# Patient Record
Sex: MALE | Race: WHITE | Employment: FULL TIME | URBAN - METROPOLITAN AREA
[De-identification: names, ages, dates, MRNs, and addresses within clinical notes are randomized per-mention and may not be internally consistent; named-entity substitution may affect disease eponyms.]

---

## 2019-09-06 PROCEDURE — 82784 ASSAY IGA/IGD/IGG/IGM EACH: CPT | Performed by: INTERNAL MEDICINE

## 2019-09-06 PROCEDURE — 86803 HEPATITIS C AB TEST: CPT | Performed by: INTERNAL MEDICINE

## 2019-09-06 PROCEDURE — 87340 HEPATITIS B SURFACE AG IA: CPT | Performed by: INTERNAL MEDICINE

## 2019-10-17 PROCEDURE — 88305 TISSUE EXAM BY PATHOLOGIST: CPT | Performed by: INTERNAL MEDICINE

## 2020-03-10 DIAGNOSIS — E11.9 DIABETES MELLITUS (HCC): Primary | ICD-10-CM

## 2021-01-08 ENCOUNTER — HOSPITAL ENCOUNTER (OUTPATIENT)
Dept: GENERAL RADIOLOGY | Age: 51
Discharge: HOME | End: 2021-01-08
Attending: FAMILY MEDICINE
Payer: COMMERCIAL

## 2021-01-08 DIAGNOSIS — J06.9 VIRAL UPPER RESPIRATORY TRACT INFECTION: ICD-10-CM

## 2021-01-08 DIAGNOSIS — R05.9 COUGH: ICD-10-CM

## 2021-01-08 DIAGNOSIS — U07.1 LAB TEST POSITIVE FOR DETECTION OF COVID-19 VIRUS: ICD-10-CM

## 2021-01-08 PROCEDURE — 71046 X-RAY EXAM CHEST 2 VIEWS: CPT | Performed by: FAMILY MEDICINE

## 2021-01-10 ENCOUNTER — APPOINTMENT (OUTPATIENT)
Dept: GENERAL RADIOLOGY | Facility: HOSPITAL | Age: 51
DRG: 177 | End: 2021-01-10
Attending: EMERGENCY MEDICINE
Payer: COMMERCIAL

## 2021-01-10 ENCOUNTER — APPOINTMENT (OUTPATIENT)
Dept: CT IMAGING | Facility: HOSPITAL | Age: 51
DRG: 177 | End: 2021-01-10
Attending: EMERGENCY MEDICINE
Payer: COMMERCIAL

## 2021-01-10 ENCOUNTER — HOSPITAL ENCOUNTER (INPATIENT)
Facility: HOSPITAL | Age: 51
LOS: 6 days | Discharge: HOME | DRG: 177 | End: 2021-01-16
Attending: EMERGENCY MEDICINE | Admitting: HOSPITALIST
Payer: COMMERCIAL

## 2021-01-10 DIAGNOSIS — U07.1 ACUTE RESPIRATORY FAILURE DUE TO COVID-19 (HCC): Primary | ICD-10-CM

## 2021-01-10 DIAGNOSIS — J96.00 ACUTE RESPIRATORY FAILURE DUE TO COVID-19 (HCC): Primary | ICD-10-CM

## 2021-01-10 LAB
ALBUMIN SERPL-MCNC: 2.8 G/DL (ref 3.4–5)
ALBUMIN/GLOB SERPL: 0.5 {RATIO} (ref 1–2)
ALP LIVER SERPL-CCNC: 95 U/L
ALT SERPL-CCNC: 26 U/L
ANION GAP SERPL CALC-SCNC: 6 MMOL/L (ref 0–18)
ANION GAP SERPL CALC-SCNC: 6 MMOL/L (ref 0–18)
ANTIBODY SCREEN: NEGATIVE
AST SERPL-CCNC: 40 U/L (ref 15–37)
BASOPHILS # BLD AUTO: 0.05 X10(3) UL (ref 0–0.2)
BASOPHILS NFR BLD AUTO: 0.4 %
BILIRUB SERPL-MCNC: 0.5 MG/DL (ref 0.1–2)
BILIRUB UR QL: NEGATIVE
BUN BLD-MCNC: 13 MG/DL (ref 7–18)
BUN BLD-MCNC: 13 MG/DL (ref 7–18)
BUN/CREAT SERPL: 14.1 (ref 10–20)
BUN/CREAT SERPL: 14.1 (ref 10–20)
CALCIUM BLD-MCNC: 9.2 MG/DL (ref 8.5–10.1)
CALCIUM BLD-MCNC: 9.2 MG/DL (ref 8.5–10.1)
CHLORIDE SERPL-SCNC: 96 MMOL/L (ref 98–112)
CHLORIDE SERPL-SCNC: 96 MMOL/L (ref 98–112)
CK SERPL-CCNC: 45 U/L
CLARITY UR: CLEAR
CO2 SERPL-SCNC: 31 MMOL/L (ref 21–32)
CO2 SERPL-SCNC: 31 MMOL/L (ref 21–32)
COLOR UR: YELLOW
CREAT BLD-MCNC: 0.92 MG/DL
CREAT BLD-MCNC: 0.92 MG/DL
CRP SERPL-MCNC: 7.44 MG/DL (ref ?–0.3)
D DIMER PPP FEU-MCNC: 1.12 UG/ML FEU (ref ?–0.52)
DEPRECATED HBV CORE AB SER IA-ACNC: 731.5 NG/ML
DEPRECATED RDW RBC AUTO: 37 FL (ref 35.1–46.3)
EOSINOPHIL # BLD AUTO: 0 X10(3) UL (ref 0–0.7)
EOSINOPHIL NFR BLD AUTO: 0 %
ERYTHROCYTE [DISTWIDTH] IN BLOOD BY AUTOMATED COUNT: 11.5 % (ref 11–15)
GLOBULIN PLAS-MCNC: 5.5 G/DL (ref 2.8–4.4)
GLUCOSE BLD-MCNC: 332 MG/DL (ref 70–99)
GLUCOSE BLD-MCNC: 332 MG/DL (ref 70–99)
GLUCOSE BLDC GLUCOMTR-MCNC: 295 MG/DL (ref 70–99)
GLUCOSE BLDC GLUCOMTR-MCNC: 379 MG/DL (ref 70–99)
GLUCOSE UR-MCNC: >=500 MG/DL
HCT VFR BLD AUTO: 47.7 %
HGB BLD-MCNC: 16.4 G/DL
HGB UR QL STRIP.AUTO: NEGATIVE
IMM GRANULOCYTES # BLD AUTO: 0.17 X10(3) UL (ref 0–1)
IMM GRANULOCYTES NFR BLD: 1.3 %
LDH SERPL L TO P-CCNC: 533 U/L
LEUKOCYTE ESTERASE UR QL STRIP.AUTO: NEGATIVE
LYMPHOCYTES # BLD AUTO: 0.96 X10(3) UL (ref 1–4)
LYMPHOCYTES NFR BLD AUTO: 7.1 %
M PROTEIN MFR SERPL ELPH: 8.3 G/DL (ref 6.4–8.2)
MCH RBC QN AUTO: 30.7 PG (ref 26–34)
MCHC RBC AUTO-ENTMCNC: 34.4 G/DL (ref 31–37)
MCV RBC AUTO: 89.3 FL
MONOCYTES # BLD AUTO: 0.68 X10(3) UL (ref 0.1–1)
MONOCYTES NFR BLD AUTO: 5 %
NEUTROPHILS # BLD AUTO: 11.61 X10 (3) UL (ref 1.5–7.7)
NEUTROPHILS # BLD AUTO: 11.61 X10(3) UL (ref 1.5–7.7)
NEUTROPHILS NFR BLD AUTO: 86.2 %
NITRITE UR QL STRIP.AUTO: NEGATIVE
NT-PROBNP SERPL-MCNC: 296 PG/ML (ref ?–125)
NT-PROBNP SERPL-MCNC: 383 PG/ML (ref ?–125)
OSMOLALITY SERPL CALC.SUM OF ELEC: 289 MOSM/KG (ref 275–295)
OSMOLALITY SERPL CALC.SUM OF ELEC: 289 MOSM/KG (ref 275–295)
PH UR: 6 [PH] (ref 5–8)
PLATELET # BLD AUTO: 453 10(3)UL (ref 150–450)
POTASSIUM SERPL-SCNC: 3.8 MMOL/L (ref 3.5–5.1)
POTASSIUM SERPL-SCNC: 3.8 MMOL/L (ref 3.5–5.1)
PROCALCITONIN SERPL-MCNC: 0.05 NG/ML (ref ?–0.16)
PROCALCITONIN SERPL-MCNC: 0.05 NG/ML (ref ?–0.16)
PROT UR-MCNC: NEGATIVE MG/DL
RBC # BLD AUTO: 5.34 X10(6)UL
RH BLOOD TYPE: POSITIVE
SODIUM SERPL-SCNC: 133 MMOL/L (ref 136–145)
SODIUM SERPL-SCNC: 133 MMOL/L (ref 136–145)
TROPONIN I SERPL-MCNC: <0.045 NG/ML (ref ?–0.04)
UROBILINOGEN UR STRIP-ACNC: 2
WBC # BLD AUTO: 13.5 X10(3) UL (ref 4–11)

## 2021-01-10 PROCEDURE — 71260 CT THORAX DX C+: CPT | Performed by: EMERGENCY MEDICINE

## 2021-01-10 PROCEDURE — 71045 X-RAY EXAM CHEST 1 VIEW: CPT | Performed by: EMERGENCY MEDICINE

## 2021-01-10 PROCEDURE — 99221 1ST HOSP IP/OBS SF/LOW 40: CPT | Performed by: INTERNAL MEDICINE

## 2021-01-10 PROCEDURE — 3E0333Z INTRODUCTION OF ANTI-INFLAMMATORY INTO PERIPHERAL VEIN, PERCUTANEOUS APPROACH: ICD-10-PCS | Performed by: EMERGENCY MEDICINE

## 2021-01-10 PROCEDURE — 99223 1ST HOSP IP/OBS HIGH 75: CPT | Performed by: HOSPITALIST

## 2021-01-10 RX ORDER — DEXAMETHASONE SODIUM PHOSPHATE 4 MG/ML
6 VIAL (ML) INJECTION DAILY
Status: DISCONTINUED | OUTPATIENT
Start: 2021-01-11 | End: 2021-01-16

## 2021-01-10 RX ORDER — SODIUM CHLORIDE 9 MG/ML
INJECTION, SOLUTION INTRAVENOUS CONTINUOUS
Status: DISCONTINUED | OUTPATIENT
Start: 2021-01-10 | End: 2021-01-13

## 2021-01-10 RX ORDER — DEXAMETHASONE SODIUM PHOSPHATE 4 MG/ML
6 VIAL (ML) INJECTION DAILY
Status: DISCONTINUED | OUTPATIENT
Start: 2021-01-10 | End: 2021-01-10

## 2021-01-10 RX ORDER — ATORVASTATIN CALCIUM 40 MG/1
40 TABLET, FILM COATED ORAL NIGHTLY
COMMUNITY

## 2021-01-10 RX ORDER — BRIMONIDINE TARTRATE 2 MG/ML
1 SOLUTION/ DROPS OPHTHALMIC 3 TIMES DAILY
Status: DISCONTINUED | OUTPATIENT
Start: 2021-01-10 | End: 2021-01-16

## 2021-01-10 RX ORDER — MORPHINE SULFATE 4 MG/ML
4 INJECTION, SOLUTION INTRAMUSCULAR; INTRAVENOUS EVERY 2 HOUR PRN
Status: DISCONTINUED | OUTPATIENT
Start: 2021-01-10 | End: 2021-01-16

## 2021-01-10 RX ORDER — ACETAMINOPHEN 325 MG/1
650 TABLET ORAL EVERY 4 HOURS PRN
Status: DISCONTINUED | OUTPATIENT
Start: 2021-01-10 | End: 2021-01-16

## 2021-01-10 RX ORDER — HYDROCODONE BITARTRATE AND ACETAMINOPHEN 5; 325 MG/1; MG/1
2 TABLET ORAL EVERY 4 HOURS PRN
Status: DISCONTINUED | OUTPATIENT
Start: 2021-01-10 | End: 2021-01-16

## 2021-01-10 RX ORDER — POTASSIUM CHLORIDE 20 MEQ/1
40 TABLET, EXTENDED RELEASE ORAL ONCE
Status: COMPLETED | OUTPATIENT
Start: 2021-01-10 | End: 2021-01-10

## 2021-01-10 RX ORDER — SODIUM CHLORIDE 9 MG/ML
INJECTION, SOLUTION INTRAVENOUS CONTINUOUS
Status: DISCONTINUED | OUTPATIENT
Start: 2021-01-10 | End: 2021-01-10

## 2021-01-10 RX ORDER — HYDROCODONE BITARTRATE AND ACETAMINOPHEN 5; 325 MG/1; MG/1
1 TABLET ORAL EVERY 4 HOURS PRN
Status: DISCONTINUED | OUTPATIENT
Start: 2021-01-10 | End: 2021-01-16

## 2021-01-10 RX ORDER — ATORVASTATIN CALCIUM 40 MG/1
40 TABLET, FILM COATED ORAL NIGHTLY
Status: DISCONTINUED | OUTPATIENT
Start: 2021-01-10 | End: 2021-01-16

## 2021-01-10 RX ORDER — MORPHINE SULFATE 2 MG/ML
1 INJECTION, SOLUTION INTRAMUSCULAR; INTRAVENOUS EVERY 2 HOUR PRN
Status: DISCONTINUED | OUTPATIENT
Start: 2021-01-10 | End: 2021-01-16

## 2021-01-10 RX ORDER — ENOXAPARIN SODIUM 100 MG/ML
40 INJECTION SUBCUTANEOUS DAILY
Status: DISCONTINUED | OUTPATIENT
Start: 2021-01-10 | End: 2021-01-13

## 2021-01-10 RX ORDER — MORPHINE SULFATE 2 MG/ML
2 INJECTION, SOLUTION INTRAMUSCULAR; INTRAVENOUS EVERY 2 HOUR PRN
Status: DISCONTINUED | OUTPATIENT
Start: 2021-01-10 | End: 2021-01-16

## 2021-01-10 RX ORDER — TIMOLOL MALEATE 5 MG/ML
1 SOLUTION/ DROPS OPHTHALMIC 2 TIMES DAILY
Status: DISCONTINUED | OUTPATIENT
Start: 2021-01-10 | End: 2021-01-16

## 2021-01-10 RX ORDER — AZITHROMYCIN 250 MG/1
250 TABLET, FILM COATED ORAL DAILY
Status: ON HOLD | COMMUNITY
End: 2021-01-16

## 2021-01-10 RX ORDER — ONDANSETRON 2 MG/ML
4 INJECTION INTRAMUSCULAR; INTRAVENOUS EVERY 6 HOURS PRN
Status: DISCONTINUED | OUTPATIENT
Start: 2021-01-10 | End: 2021-01-16

## 2021-01-10 RX ORDER — PREDNISONE 20 MG/1
20 TABLET ORAL 2 TIMES DAILY
Status: ON HOLD | COMMUNITY
End: 2021-01-16

## 2021-01-10 RX ORDER — DEXAMETHASONE SODIUM PHOSPHATE 4 MG/ML
6 VIAL (ML) INJECTION ONCE
Status: COMPLETED | OUTPATIENT
Start: 2021-01-10 | End: 2021-01-10

## 2021-01-10 RX ORDER — ENOXAPARIN SODIUM 100 MG/ML
40 INJECTION SUBCUTANEOUS DAILY
Status: DISCONTINUED | OUTPATIENT
Start: 2021-01-10 | End: 2021-01-10

## 2021-01-10 NOTE — PROGRESS NOTES
Sx onset- 12/27  Covid positive- 12/31  Admitted- 1/10    Onset of sx >14 days     CXR- 1/10- atypical pneumonia  Chest CT- 1/10- Negative PE, Bilateral GGO     DVT prophylaxis- Lovenox 40mg daily     02- 10LHF     Current temp- 98.2, afebrile this admissi

## 2021-01-10 NOTE — PLAN OF CARE
Received patient from ER. Patient alert and oriented x4, pleasant. Patient oriented to room and call light. Continues on 10L O2 via high flow nasal cannula, saturating in the low 90's. Dry cough. No complaint of pain. Afebrile.  Bed alarm and fall precautio relaxation techniques  - Monitor for opioid side effects  - Notify MD/LIP if interventions unsuccessful or patient reports new pain  - Anticipate increased pain with activity and pre-medicate as appropriate  Outcome: Progressing     Problem: RISK FOR INFEC cognitive ability or social support system  Outcome: Progressing     Problem: Altered Communication/Language Barrier  Goal: Patient/Family is able to understand and participate in their care  Description: Interventions:  - Assess communication ability and

## 2021-01-10 NOTE — PLAN OF CARE
Patient received in bed from emergency room. Alert x 4. Patient made comfortable in the bed. Vital signs taken and stable. Intravenous fluids infusing and tolerated. Patient denied pain or discomfort. Currently on 10 liters of high flow nasal cannula.  No c

## 2021-01-10 NOTE — PROGRESS NOTES
[facility] Note: Antimicrobial Weight Based Dose Adjustment for: ceftriaxone (ROCEPHIN)    Deacon Padilla is a 44year old patient who has been prescribed ceftriaxone (ROCEPHIN) 1 g every 24 hours.       Wt Readings from Last 6 Encounters:  01/10/21 : 106.6 k

## 2021-01-10 NOTE — H&P
[address] Patient Status:  Emergency    1970 MRN [id number]   Location [address] Attending Liz Mark MD   Hosp Day # 0 PCP Gregg Grimaldo MD     Date:  1/10/2021 Warm and hydrated  PSYCHIATRIC: Calm and cooperative   HEENT:  Head was atraumatic and normocephalic. Eyes:  Extraocular muscles were intact. Sclera was anicteric. Pupils were equally reactive to light. Ears: There were no lesions.   Nose:  No lesions K 3.8  3.8   CL 96*  96*   CO2 31.0  31.0   ALKPHO 95   AST 40*   ALT 26   BILT 0.5   TP 8.3*       Xr Chest Ap Portable  (cpt=71045)    Result Date: 1/10/2021  CONCLUSION:  1. Atypical pneumonia.     Dictated by (CST): Kay Hill MD on 1/10/

## 2021-01-10 NOTE — CONSULTS
[facility] HOSP - [facility]    Report of Consultation    Chas Sterling Patient Status:  Emergency    1970 MRN [id number]   Location [address] Attending Elyse Mcmillan MD   Hosp Day # 0 PCP Will MD Ankur     Date of Admis Psychiatric: denies depression, anxiety  Hematologic: denies bruising        Physical Exam:   Blood pressure 155/90, pulse 67, temperature 98.9 °F (37.2 °C), temperature source Oral, resp.  rate 24, height 5' 11\" (1.803 m), weight 235 lb (106.6 kg), SpO2 Hypertension    Plan   -Patient presents with evidence of acute hypoxemic respiratory failure secondary COVID-19 pneumonia. Symptom onset on 12/27/2020.  -CT chest with no evidence of pulmonary embolism. Bilateral groundglass opacities seen.   -Given symp

## 2021-01-10 NOTE — ED NOTES
Orders for admission, patient is aware of plan and ready to go upstairs. Any questions, please call Maryjane Albert RN at [address].    Type of COVID test sent:+ COVID  COVID Suspicion level:High    Titratable drug(s) infusin.9ns bolus  Rate:    LOC at time

## 2021-01-10 NOTE — ED PROVIDER NOTES
Patient Seen in: [facility] AND [facility] Emergency Department      History   Patient presents with:  Difficulty Breathing    Stated Complaint: T/L-call from wife. pt diagnosed with covid 12/31. worsening SOB x3 days.      HPI/Subjective:   HPI    44year old ma present. Heart sounds: Normal heart sounds. No murmur. Pulmonary:      Effort: Tachypnea and respiratory distress present. Breath sounds: Rhonchi present. No decreased breath sounds or wheezing.    Abdominal:      General: There is no distension components within normal limits   POCT GLUCOSE - Abnormal; Notable for the following components:    POC Glucose  295 (*)     All other components within normal limits   CBC W/ DIFFERENTIAL - Abnormal; Notable for the following components:    WBC 13.5 (*) normal for rate and rhythm     Radiology findings: Xr Chest Ap Portable  (cpt=71045)    Result Date: 1/10/2021  CONCLUSION:  1. Atypical pneumonia.     Dictated by (CST): Jaylon Patton MD on 1/10/2021 at 1:02 PM     Finalized by (CST): Abdi

## 2021-01-11 LAB
ALBUMIN SERPL-MCNC: 2.2 G/DL (ref 3.4–5)
ALBUMIN/GLOB SERPL: 0.5 {RATIO} (ref 1–2)
ALP LIVER SERPL-CCNC: 82 U/L
ALT SERPL-CCNC: 23 U/L
ANION GAP SERPL CALC-SCNC: 6 MMOL/L (ref 0–18)
AST SERPL-CCNC: 26 U/L (ref 15–37)
BASOPHILS # BLD AUTO: 0.02 X10(3) UL (ref 0–0.2)
BASOPHILS NFR BLD AUTO: 0.2 %
BILIRUB SERPL-MCNC: 0.4 MG/DL (ref 0.1–2)
BUN BLD-MCNC: 14 MG/DL (ref 7–18)
BUN/CREAT SERPL: 20.9 (ref 10–20)
CALCIUM BLD-MCNC: 8.7 MG/DL (ref 8.5–10.1)
CHLORIDE SERPL-SCNC: 102 MMOL/L (ref 98–112)
CO2 SERPL-SCNC: 27 MMOL/L (ref 21–32)
CREAT BLD-MCNC: 0.67 MG/DL
CRP SERPL-MCNC: 5.17 MG/DL (ref ?–0.3)
D DIMER PPP FEU-MCNC: 1.33 UG/ML FEU (ref ?–0.52)
DEPRECATED HBV CORE AB SER IA-ACNC: 495.6 NG/ML
DEPRECATED RDW RBC AUTO: 37.2 FL (ref 35.1–46.3)
EOSINOPHIL # BLD AUTO: 0 X10(3) UL (ref 0–0.7)
EOSINOPHIL NFR BLD AUTO: 0 %
ERYTHROCYTE [DISTWIDTH] IN BLOOD BY AUTOMATED COUNT: 11.2 % (ref 11–15)
EST. AVERAGE GLUCOSE BLD GHB EST-MCNC: 229 MG/DL (ref 68–126)
GLOBULIN PLAS-MCNC: 4.8 G/DL (ref 2.8–4.4)
GLUCOSE BLD-MCNC: 267 MG/DL (ref 70–99)
GLUCOSE BLDC GLUCOMTR-MCNC: 237 MG/DL (ref 70–99)
GLUCOSE BLDC GLUCOMTR-MCNC: 266 MG/DL (ref 70–99)
GLUCOSE BLDC GLUCOMTR-MCNC: 273 MG/DL (ref 70–99)
GLUCOSE BLDC GLUCOMTR-MCNC: 278 MG/DL (ref 70–99)
HBA1C MFR BLD HPLC: 9.6 % (ref ?–5.7)
HCT VFR BLD AUTO: 43.7 %
HGB BLD-MCNC: 14.6 G/DL
IMM GRANULOCYTES # BLD AUTO: 0.09 X10(3) UL (ref 0–1)
IMM GRANULOCYTES NFR BLD: 0.9 %
LDH SERPL L TO P-CCNC: 380 U/L
LYMPHOCYTES # BLD AUTO: 1.17 X10(3) UL (ref 1–4)
LYMPHOCYTES NFR BLD AUTO: 11.3 %
M PROTEIN MFR SERPL ELPH: 7 G/DL (ref 6.4–8.2)
MCH RBC QN AUTO: 30.2 PG (ref 26–34)
MCHC RBC AUTO-ENTMCNC: 33.4 G/DL (ref 31–37)
MCV RBC AUTO: 90.5 FL
MONOCYTES # BLD AUTO: 0.98 X10(3) UL (ref 0.1–1)
MONOCYTES NFR BLD AUTO: 9.5 %
NEUTROPHILS # BLD AUTO: 8.1 X10 (3) UL (ref 1.5–7.7)
NEUTROPHILS # BLD AUTO: 8.1 X10(3) UL (ref 1.5–7.7)
NEUTROPHILS NFR BLD AUTO: 78.1 %
OSMOLALITY SERPL CALC.SUM OF ELEC: 290 MOSM/KG (ref 275–295)
PLATELET # BLD AUTO: 410 10(3)UL (ref 150–450)
POTASSIUM SERPL-SCNC: 4 MMOL/L (ref 3.5–5.1)
RBC # BLD AUTO: 4.83 X10(6)UL
SODIUM SERPL-SCNC: 135 MMOL/L (ref 136–145)
TROPONIN I SERPL-MCNC: <0.045 NG/ML (ref ?–0.04)
WBC # BLD AUTO: 10.4 X10(3) UL (ref 4–11)

## 2021-01-11 PROCEDURE — XW033H5 INTRODUCTION OF TOCILIZUMAB INTO PERIPHERAL VEIN, PERCUTANEOUS APPROACH, NEW TECHNOLOGY GROUP 5: ICD-10-PCS | Performed by: INTERNAL MEDICINE

## 2021-01-11 PROCEDURE — 99233 SBSQ HOSP IP/OBS HIGH 50: CPT | Performed by: HOSPITALIST

## 2021-01-11 PROCEDURE — 99232 SBSQ HOSP IP/OBS MODERATE 35: CPT | Performed by: PHYSICIAN ASSISTANT

## 2021-01-11 NOTE — PLAN OF CARE
Pt night time blood sugar was 379. Dr. Sandi Waller was noted of this with new orders noted. Sliding scale changed to high dose and ACHS. PT slept well all night.  Remains on 10L NC with o2 sats 89-96% He desats with exertion activity but rcovers quickly Verbalizes/displays adequate comfort level or patient's stated pain goal  Description: INTERVENTIONS:  - Encourage pt to monitor pain and request assistance  - Assess pain using appropriate pain scale  - Administer analgesics based on type and severity of other facility with appropriate resources  Description: INTERVENTIONS:  - Identify barriers to discharge w/pt and caregiver  - Include patient/family/discharge partner in discharge planning  - Arrange for needed discharge resources and transportation as ap

## 2021-01-11 NOTE — CM/SW NOTE
ELIA received MDO for CDI- obtain COVID lab result for record. Due to isolation precautions and to conserve PPE, ELIA attempted to contact patient via room phone x2 but to no avail. SW to continue to follow.     Addendum 1/12/2021:  ELIA contacted patient v

## 2021-01-11 NOTE — PAYOR COMM NOTE
Appropriate for inpatient status per guidelines for SOB due to COVID pneumonia with hypoxia.       --------------  ADMISSION REVIEW     Payor: [address] [place] [address] #:  [id number]  Authorization Number: [id number]       ED Provide Musculoskeletal: Full passive range of motion without pain, normal range of motion and neck supple. Normal range of motion. No neck rigidity. Cardiovascular:      Rate and Rhythm: Regular rhythm. Tachycardia present.       Heart sounds: Normal heart Abnormal; Notable for the following components:    C-Reactive Protein 7.44 (*)     All other components within normal limits   FERRITIN - Abnormal; Notable for the following components:    Ferritin 731.5 (*)     All other components within normal limits - will consult      Disposition and Plan     Clinical Impression:  Acute respiratory failure due to COVID-19 [facility])  (primary encounter diagnosis)    Disposition:  Admit  1/10/2021  1:37 pm             H&P - H&P Note             History provided by: Patient entry was good. No crackles or wheezes   ABDOMEN: Soft and non-tender. Bowel sounds were present. MUSCULOSKELETAL:  There was no deformity. There was full range of motion in all the extremities.    EXTREMITIES: There was no edema, clubbing or cyanosis --------------------------       Assessment/Plan:     Acute respiratory failure due to COVID-19 [facility])  - CT of chest negative for PE  - CXR shows bilateral ground-glass opacities  - will continue IV decadron   - no actemra or remdesivir for now  - ID and P 12/31  -CTA no PE / bilateral groundglass opacities  -Mild inflammation - downtrending  -Wbc improved  -Decadron started 1/10  -IV Rocephin started 1/10  -Completed outpatient course of azithromycin prior to admission  -Not candidate for RDV or CCP given s

## 2021-01-11 NOTE — CONSULTS
[facility] ID CONSULT NOTE    Faviloa Whaley Patient Status:  Inpatient    1970 MRN [id number]   Location [facility] 5SW/SE Attending Elli La MD   Hosp Day # 1 PCP Rene Holley MD       Reason for Consultation •  ondansetron HCl (ZOFRAN) injection 4 mg, 4 mg, Intravenous, Q6H PRN  •  cefTRIAXone Sodium (ROCEPHIN) 2 g in sodium chloride 0.9% 100 mL MBP/ADD-vantage, 2 g, Intravenous, Daily  •  dexamethasone Sodium Phosphate (DECADRON) 4 MG/ML injection 6 mg, 6 mg, Lab 01/11/21  0541   RBC 4.83   HGB 14.6   HCT 43.7   MCV 90.5   MCH 30.2   MCHC 33.4   RDW 11.2   NEPRELIM 8.10*   WBC 10.4   .0     Recent Labs   Lab 01/10/21  1101 01/11/21  0541   *  332* 267*   BUN 13  13 14   CREATSERUM 0.92  0.92 0.67*

## 2021-01-11 NOTE — PROGRESS NOTES
[facility]    Progress Note    Kirk Martinez Patient Status:  Inpatient    1970 MRN [id number]   Location [facility] 5SW/SE Attending Arelis Baker MD   Hosp Day # 1 PCP Mallorie Peraza MD        Subjective:    The patient is a standard dosing    D/w unit APN.     Results:     Lab Results   Component Value Date    WBC 10.4 01/11/2021    HGB 14.6 01/11/2021    HCT 43.7 01/11/2021    .0 01/11/2021    CREATSERUM 0.67 (L) 01/11/2021    BUN 14 01/11/2021     (L) 01/11/2021

## 2021-01-11 NOTE — PLAN OF CARE
Covid +: 2020  Symptom Onset: 2020  Tmax: Afebrile  O2: 10L HF at 90%,wean as tolerated    Monitor Labs,    LDH: 380  Ferritin: 495  CRP: 5.17  DDimer: 1.33    Antibiotics: Rocephin   Blood Thinner: lovenox 40mg daily  Decadron:sarte

## 2021-01-12 LAB
ANION GAP SERPL CALC-SCNC: 4 MMOL/L (ref 0–18)
BASOPHILS # BLD AUTO: 0.02 X10(3) UL (ref 0–0.2)
BASOPHILS NFR BLD AUTO: 0.2 %
BUN BLD-MCNC: 12 MG/DL (ref 7–18)
BUN/CREAT SERPL: 20 (ref 10–20)
CALCIUM BLD-MCNC: 8.5 MG/DL (ref 8.5–10.1)
CHLORIDE SERPL-SCNC: 100 MMOL/L (ref 98–112)
CO2 SERPL-SCNC: 30 MMOL/L (ref 21–32)
CREAT BLD-MCNC: 0.6 MG/DL
CRP SERPL-MCNC: 2.42 MG/DL (ref ?–0.3)
D DIMER PPP FEU-MCNC: 3.72 UG/ML FEU (ref ?–0.52)
DEPRECATED HBV CORE AB SER IA-ACNC: 354.3 NG/ML
DEPRECATED RDW RBC AUTO: 35.7 FL (ref 35.1–46.3)
EOSINOPHIL # BLD AUTO: 0.03 X10(3) UL (ref 0–0.7)
EOSINOPHIL NFR BLD AUTO: 0.3 %
ERYTHROCYTE [DISTWIDTH] IN BLOOD BY AUTOMATED COUNT: 11.1 % (ref 11–15)
GLUCOSE BLD-MCNC: 244 MG/DL (ref 70–99)
GLUCOSE BLDC GLUCOMTR-MCNC: 224 MG/DL (ref 70–99)
GLUCOSE BLDC GLUCOMTR-MCNC: 282 MG/DL (ref 70–99)
GLUCOSE BLDC GLUCOMTR-MCNC: 328 MG/DL (ref 70–99)
GLUCOSE BLDC GLUCOMTR-MCNC: 333 MG/DL (ref 70–99)
HCT VFR BLD AUTO: 42.5 %
HGB BLD-MCNC: 14.6 G/DL
IMM GRANULOCYTES # BLD AUTO: 0.16 X10(3) UL (ref 0–1)
IMM GRANULOCYTES NFR BLD: 1.4 %
LDH SERPL L TO P-CCNC: 353 U/L
LYMPHOCYTES # BLD AUTO: 1.38 X10(3) UL (ref 1–4)
LYMPHOCYTES NFR BLD AUTO: 12.5 %
MCH RBC QN AUTO: 30.5 PG (ref 26–34)
MCHC RBC AUTO-ENTMCNC: 34.4 G/DL (ref 31–37)
MCV RBC AUTO: 88.9 FL
MONOCYTES # BLD AUTO: 0.84 X10(3) UL (ref 0.1–1)
MONOCYTES NFR BLD AUTO: 7.6 %
NEUTROPHILS # BLD AUTO: 8.64 X10 (3) UL (ref 1.5–7.7)
NEUTROPHILS # BLD AUTO: 8.64 X10(3) UL (ref 1.5–7.7)
NEUTROPHILS NFR BLD AUTO: 78 %
OSMOLALITY SERPL CALC.SUM OF ELEC: 286 MOSM/KG (ref 275–295)
PLATELET # BLD AUTO: 408 10(3)UL (ref 150–450)
POTASSIUM SERPL-SCNC: 3.8 MMOL/L (ref 3.5–5.1)
RBC # BLD AUTO: 4.78 X10(6)UL
SODIUM SERPL-SCNC: 134 MMOL/L (ref 136–145)
WBC # BLD AUTO: 11.1 X10(3) UL (ref 4–11)

## 2021-01-12 PROCEDURE — 99233 SBSQ HOSP IP/OBS HIGH 50: CPT | Performed by: HOSPITALIST

## 2021-01-12 PROCEDURE — 99232 SBSQ HOSP IP/OBS MODERATE 35: CPT | Performed by: INTERNAL MEDICINE

## 2021-01-12 RX ORDER — ECHINACEA PURPUREA EXTRACT 125 MG
1 TABLET ORAL
Status: DISCONTINUED | OUTPATIENT
Start: 2021-01-12 | End: 2021-01-16

## 2021-01-12 RX ORDER — POTASSIUM CHLORIDE 20 MEQ/1
40 TABLET, EXTENDED RELEASE ORAL ONCE
Status: COMPLETED | OUTPATIENT
Start: 2021-01-12 | End: 2021-01-12

## 2021-01-12 NOTE — PROGRESS NOTES
[facility]     Progress Note        Emma Hayes Patient Status:  Inpatient    1970 MRN [id number]   Location [facility] 5SW/SE Attending Sam Frazier MD   Hosp Day # 2 PCP Bernard Barbour MD       Subjective:   Patient seen a Eyes, TID    •  0.9% NaCl infusion, , Intravenous, Continuous    •  acetaminophen (TYLENOL) tab 650 mg, 650 mg, Oral, Q4H PRN    Or    •  HYDROcodone-acetaminophen (NORCO) 5-325 MG per tab 1 tablet, 1 tablet, Oral, Q4H PRN    Or    •  HYDROcodone-acetamino [address]

## 2021-01-12 NOTE — DIETARY NOTE
Brief NUTRITION NOTE    Pt screened at nutritional risk on admission by RN. Chart review completed by RD due to COVID19+. Spoke with pt over the phone.  He reports increasing po intake, markedly improved appetite, reports ate % of bfast and lunch to

## 2021-01-12 NOTE — PROGRESS NOTES
[facility]    Progress Note    Tod Gaming Patient Status:  Inpatient    1970 MRN [id number]   Location [facility] 5SW/SE Attending Koko Castillo MD   Hosp Day # 2 PCP Cheyanne Carmen MD       Subjective:   Tod Gaming is curre (CST): Erica Patton MD on 1/10/2021 at 1:33 PM              • enoxaparin  40 mg Subcutaneous Daily   • cefTRIAXone  2 g Intravenous Daily   • dexamethasone Sodium Phosphate  6 mg Intravenous Daily   • atorvastatin  40 mg Oral Nightly   • brimoni

## 2021-01-12 NOTE — PLAN OF CARE
Problem: Patient Centered Care  Goal: Patient preferences are identified and integrated in the patient's plan of care  Description: Interventions:  - What would you like us to know as we care for you?  I was tested positive for covid on 12/31  - Provide t physical limitations  - Instruct pt to call for assistance with activity based on assessment  - Modify environment to reduce risk of injury  - Provide assistive devices as appropriate  - Consider OT/PT consult to assist with strengthening/mobility  - Encou INTERVENTIONS:  - Assess for changes in respiratory status  - Assess for changes in mentation and behavior  - Position to facilitate oxygenation and minimize respiratory effort  - Oxygen supplementation based on oxygen saturation or ABGs  - Provide Smoking

## 2021-01-12 NOTE — PLAN OF CARE
Problem: Patient Centered Care  Goal: Patient preferences are identified and integrated in the patient's plan of care  Description: Interventions:  - What would you like us to know as we care for you?  From home with family  - Provide timely, complete, an Progressing     Problem: SAFETY ADULT - FALL  Goal: Free from fall injury  Description: INTERVENTIONS:  - Assess pt frequently for physical needs  - Identify cognitive and physical deficits and behaviors that affect risk of falls.   - [place] fall precaut

## 2021-01-12 NOTE — PROGRESS NOTES
[facility]    Progress Note    Inell Riceville Patient Status:  Inpatient    1970 MRN [id number]   Location 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  ABNO

## 2021-01-12 NOTE — PLAN OF CARE
Covid +: 2020  Symptom Onset: 2020  Tmax: Afebrile  O2: 10L HF at 90%,wean as tolerated    Monitor Labs,    LDH: 380 -> 353  Ferritin: 495 -> 354  CRP: 5.17 -> 2.42  DDimer: 1.33 -> 3.72    Antibiotics: Rocephin   Blood Thinner: love

## 2021-01-13 ENCOUNTER — APPOINTMENT (OUTPATIENT)
Dept: GENERAL RADIOLOGY | Facility: HOSPITAL | Age: 51
DRG: 177 | End: 2021-01-13
Attending: NURSE PRACTITIONER
Payer: COMMERCIAL

## 2021-01-13 ENCOUNTER — APPOINTMENT (OUTPATIENT)
Dept: ULTRASOUND IMAGING | Facility: HOSPITAL | Age: 51
DRG: 177 | End: 2021-01-13
Attending: NURSE PRACTITIONER
Payer: COMMERCIAL

## 2021-01-13 LAB
ANION GAP SERPL CALC-SCNC: 6 MMOL/L (ref 0–18)
BASOPHILS # BLD AUTO: 0.02 X10(3) UL (ref 0–0.2)
BASOPHILS NFR BLD AUTO: 0.2 %
BUN BLD-MCNC: 13 MG/DL (ref 7–18)
BUN/CREAT SERPL: 21 (ref 10–20)
CALCIUM BLD-MCNC: 8.4 MG/DL (ref 8.5–10.1)
CHLORIDE SERPL-SCNC: 100 MMOL/L (ref 98–112)
CO2 SERPL-SCNC: 28 MMOL/L (ref 21–32)
CREAT BLD-MCNC: 0.62 MG/DL
CRP SERPL-MCNC: 1.05 MG/DL (ref ?–0.3)
D DIMER PPP FEU-MCNC: 3.87 UG/ML FEU (ref ?–0.52)
DEPRECATED HBV CORE AB SER IA-ACNC: 379.1 NG/ML
DEPRECATED RDW RBC AUTO: 35.7 FL (ref 35.1–46.3)
EOSINOPHIL # BLD AUTO: 0.15 X10(3) UL (ref 0–0.7)
EOSINOPHIL NFR BLD AUTO: 1.3 %
ERYTHROCYTE [DISTWIDTH] IN BLOOD BY AUTOMATED COUNT: 11.3 % (ref 11–15)
GLUCOSE BLD-MCNC: 274 MG/DL (ref 70–99)
GLUCOSE BLDC GLUCOMTR-MCNC: 240 MG/DL (ref 70–99)
GLUCOSE BLDC GLUCOMTR-MCNC: 285 MG/DL (ref 70–99)
GLUCOSE BLDC GLUCOMTR-MCNC: 336 MG/DL (ref 70–99)
GLUCOSE BLDC GLUCOMTR-MCNC: 342 MG/DL (ref 70–99)
GLUCOSE BLDC GLUCOMTR-MCNC: 417 MG/DL (ref 70–99)
HCT VFR BLD AUTO: 43.4 %
HGB BLD-MCNC: 15.1 G/DL
IMM GRANULOCYTES # BLD AUTO: 0.16 X10(3) UL (ref 0–1)
IMM GRANULOCYTES NFR BLD: 1.4 %
INTERLEUKIN 6 BY MAFD: 8.6 PG/ML
LDH SERPL L TO P-CCNC: 348 U/L
LYMPHOCYTES # BLD AUTO: 1.24 X10(3) UL (ref 1–4)
LYMPHOCYTES NFR BLD AUTO: 10.7 %
MCH RBC QN AUTO: 30.9 PG (ref 26–34)
MCHC RBC AUTO-ENTMCNC: 34.8 G/DL (ref 31–37)
MCV RBC AUTO: 88.8 FL
MONOCYTES # BLD AUTO: 0.88 X10(3) UL (ref 0.1–1)
MONOCYTES NFR BLD AUTO: 7.6 %
NEUTROPHILS # BLD AUTO: 9.15 X10 (3) UL (ref 1.5–7.7)
NEUTROPHILS # BLD AUTO: 9.15 X10(3) UL (ref 1.5–7.7)
NEUTROPHILS NFR BLD AUTO: 78.8 %
OSMOLALITY SERPL CALC.SUM OF ELEC: 288 MOSM/KG (ref 275–295)
PLATELET # BLD AUTO: 391 10(3)UL (ref 150–450)
POTASSIUM SERPL-SCNC: 3.8 MMOL/L (ref 3.5–5.1)
RBC # BLD AUTO: 4.89 X10(6)UL
SODIUM SERPL-SCNC: 134 MMOL/L (ref 136–145)
WBC # BLD AUTO: 11.6 X10(3) UL (ref 4–11)

## 2021-01-13 PROCEDURE — 99233 SBSQ HOSP IP/OBS HIGH 50: CPT | Performed by: HOSPITALIST

## 2021-01-13 PROCEDURE — 99232 SBSQ HOSP IP/OBS MODERATE 35: CPT | Performed by: PHYSICIAN ASSISTANT

## 2021-01-13 PROCEDURE — 71045 X-RAY EXAM CHEST 1 VIEW: CPT | Performed by: NURSE PRACTITIONER

## 2021-01-13 PROCEDURE — 93970 EXTREMITY STUDY: CPT | Performed by: NURSE PRACTITIONER

## 2021-01-13 RX ORDER — POTASSIUM CHLORIDE 20 MEQ/1
40 TABLET, EXTENDED RELEASE ORAL ONCE
Status: COMPLETED | OUTPATIENT
Start: 2021-01-13 | End: 2021-01-13

## 2021-01-13 RX ORDER — FUROSEMIDE 10 MG/ML
40 INJECTION INTRAMUSCULAR; INTRAVENOUS ONCE
Status: COMPLETED | OUTPATIENT
Start: 2021-01-13 | End: 2021-01-13

## 2021-01-13 RX ORDER — ENOXAPARIN SODIUM 100 MG/ML
0.6 INJECTION SUBCUTANEOUS EVERY 12 HOURS SCHEDULED
Status: DISCONTINUED | OUTPATIENT
Start: 2021-01-13 | End: 2021-01-16

## 2021-01-13 NOTE — PLAN OF CARE
Problem: Patient Centered Care  Goal: Patient preferences are identified and integrated in the patient's plan of care  Description: Interventions:  - Provide timely, complete, and accurate information to patient/family  - Incorporate patient and family k Implement neutropenic guidelines  Outcome: Progressing     Problem: SAFETY ADULT - FALL  Goal: Free from fall injury  Description: INTERVENTIONS:  - Assess pt frequently for physical needs  - Identify cognitive and physical deficits and behaviors that affe distractions  - Allow time for understanding and response  - Establish method for patient to ask for assistance (call light)  - Provide an  as needed  - Communicate barriers and strategies to overcome with those who interact with patient  Outcom resolved, now on 10L. Desaturating with activity. BG elevated, endocrinology consulted. Call light within reach. Will continue to monitor.

## 2021-01-13 NOTE — PLAN OF CARE
Patient is alert and oriented. Vital signs stable. Denies pain this shift. Tolerating general diet with accuchecks AC/HS. 10 L nasal canula with saline mist for comfort. Norco for pain control.  Ambulating with standby assist. 0.9 at 50 mL/hr IV fluid infus injury  Description: INTERVENTIONS:  - Assess pt frequently for physical needs  - Identify cognitive and physical deficits and behaviors that affect risk of falls.   - [place] fall precautions as indicated by assessment.  - Educate pt/family on patient sa (call light)  - Provide an  as needed  - Communicate barriers and strategies to overcome with those who interact with patient  Outcome: Progressing     Problem: RESPIRATORY - ADULT  Goal: Achieves optimal ventilation and oxygenation  Description o2  -Prone    - See additional Care Plan goals for specific interventions  Outcome: Progressing     - Provide timely, complete, and accurate information to patient/family  - Incorporate patient and family knowledge, values, beliefs, and cultural background

## 2021-01-13 NOTE — PLAN OF CARE
Covid +: 2020  Symptom Onset: 2020  Tmax: Afebrile  O2: 12L HF at 95%,wean as tolerated. Patient went up to 15L but had some sinus congestion and ended up blowing his nose and now we are trending his O2 down.      Monitor Labs,    LDH

## 2021-01-13 NOTE — PROGRESS NOTES
[facility]    Progress Note    Katie Herring Patient Status:  Inpatient    1970 MRN [id number]   Location [facility] 5SW/SE Attending Jessie Prescott MD   Hosp Day # 3 PCP Skyler Reyna MD        Subjective:    The patient is a spirometry  -Decadron  -Abx  -Lovenox  -Will continue to follow    # DM  -Plan: Monitor closely while on steroid, insulin    DVT prophylaxis: Lovenox - 0.6 mg/kg twice daily    D/w unit APN and RN.     Results:     Lab Results   Component Value Date    WBC

## 2021-01-13 NOTE — PROGRESS NOTES
[facility]    Progress Note    Inesarmando Peraless Patient Status:  Inpatient    1970 MRN [id number]   Location [facility] 5SW/SE Attending Min Peres MD   Hosp Day # 3 PCP Ree Jacobs MD       Subjective:   Inesarmando Peraless is now o pneumonia.     Dictated by (CST): Jacinda Patton MD on 1/13/2021 at 10:15 AM     Finalized by (CST): Erica Patton MD on 1/13/2021 at 10:16 AM              • enoxaparin  0.6 mg/kg Subcutaneous Q12H [address]   • insulin detemir  10 Units Subcut

## 2021-01-13 NOTE — PLAN OF CARE
Alert oriented x 4,denies pain and discomfort. pt at 10l HFNC saturating at 90s. desaturating  with ambulating up to 80s. tele in place. IV fluid infusing at 50ml/hr. covered with scheduled insulins. placed call light within reach.   Problem: Patient Centered Car measures as appropriate and evaluate response  - Consider cultural and social influences on pain and pain management  - Manage/alleviate anxiety  - Utilize distraction and/or relaxation techniques  - Monitor for opioid side effects  - Notify MD/LIP if inte Encourage broncho-pulmonary hygiene including cough, deep breathe, Incentive Spirometry  - Assess the need for suctioning and perform as needed  - Assess and instruct to report SOB or any respiratory difficulty  - Respiratory Therapy support as indicated

## 2021-01-13 NOTE — PROGRESS NOTES
INFECTIOUS DISEASE PROGRESS NOTE  [facility] ID PROGRESS [facility]    Alfonso Ruiz Patient Status:  Inpatient    1970 MRN [id number]   Location [facility] 5SW/SE Attending Tarik Thayer MD   Hosp Day # 3 PCP [facility] - RDV - not candidate               - CCP - not candidate               - Actemra - 1/11               - Abx - 1/10        PLAN:  -  Continue on ceftriaxone - day #4/7.  -  FU venous dopplers.  -  Hold RDV, CCP.  -  On dexamethasone.  -  isola

## 2021-01-14 ENCOUNTER — APPOINTMENT (OUTPATIENT)
Dept: GENERAL RADIOLOGY | Facility: HOSPITAL | Age: 51
DRG: 177 | End: 2021-01-14
Attending: HOSPITALIST
Payer: COMMERCIAL

## 2021-01-14 LAB
ANION GAP SERPL CALC-SCNC: 4 MMOL/L (ref 0–18)
BASOPHILS # BLD AUTO: 0.05 X10(3) UL (ref 0–0.2)
BASOPHILS NFR BLD AUTO: 0.4 %
BUN BLD-MCNC: 17 MG/DL (ref 7–18)
BUN/CREAT SERPL: 25.8 (ref 10–20)
CALCIUM BLD-MCNC: 8.8 MG/DL (ref 8.5–10.1)
CHLORIDE SERPL-SCNC: 98 MMOL/L (ref 98–112)
CO2 SERPL-SCNC: 31 MMOL/L (ref 21–32)
CREAT BLD-MCNC: 0.66 MG/DL
CRP SERPL-MCNC: 0.46 MG/DL (ref ?–0.3)
D DIMER PPP FEU-MCNC: 4.65 UG/ML FEU (ref ?–0.52)
DEPRECATED HBV CORE AB SER IA-ACNC: 439.3 NG/ML
DEPRECATED RDW RBC AUTO: 36.2 FL (ref 35.1–46.3)
EOSINOPHIL # BLD AUTO: 0.28 X10(3) UL (ref 0–0.7)
EOSINOPHIL NFR BLD AUTO: 2.2 %
ERYTHROCYTE [DISTWIDTH] IN BLOOD BY AUTOMATED COUNT: 11.4 % (ref 11–15)
GLUCOSE BLD-MCNC: 243 MG/DL (ref 70–99)
GLUCOSE BLDC GLUCOMTR-MCNC: 242 MG/DL (ref 70–99)
GLUCOSE BLDC GLUCOMTR-MCNC: 244 MG/DL (ref 70–99)
GLUCOSE BLDC GLUCOMTR-MCNC: 249 MG/DL (ref 70–99)
GLUCOSE BLDC GLUCOMTR-MCNC: 258 MG/DL (ref 70–99)
GLUCOSE BLDC GLUCOMTR-MCNC: 348 MG/DL (ref 70–99)
HCT VFR BLD AUTO: 46.9 %
HGB BLD-MCNC: 16.2 G/DL
IMM GRANULOCYTES # BLD AUTO: 0.28 X10(3) UL (ref 0–1)
IMM GRANULOCYTES NFR BLD: 2.2 %
LDH SERPL L TO P-CCNC: 351 U/L
LYMPHOCYTES # BLD AUTO: 2.01 X10(3) UL (ref 1–4)
LYMPHOCYTES NFR BLD AUTO: 15.4 %
MCH RBC QN AUTO: 31 PG (ref 26–34)
MCHC RBC AUTO-ENTMCNC: 34.5 G/DL (ref 31–37)
MCV RBC AUTO: 89.7 FL
MONOCYTES # BLD AUTO: 1.16 X10(3) UL (ref 0.1–1)
MONOCYTES NFR BLD AUTO: 8.9 %
NEUTROPHILS # BLD AUTO: 9.23 X10 (3) UL (ref 1.5–7.7)
NEUTROPHILS # BLD AUTO: 9.23 X10(3) UL (ref 1.5–7.7)
NEUTROPHILS NFR BLD AUTO: 70.9 %
OSMOLALITY SERPL CALC.SUM OF ELEC: 286 MOSM/KG (ref 275–295)
PLATELET # BLD AUTO: 412 10(3)UL (ref 150–450)
POTASSIUM SERPL-SCNC: 4 MMOL/L (ref 3.5–5.1)
RBC # BLD AUTO: 5.23 X10(6)UL
SODIUM SERPL-SCNC: 133 MMOL/L (ref 136–145)
WBC # BLD AUTO: 13 X10(3) UL (ref 4–11)

## 2021-01-14 PROCEDURE — 99232 SBSQ HOSP IP/OBS MODERATE 35: CPT | Performed by: PHYSICIAN ASSISTANT

## 2021-01-14 PROCEDURE — 99233 SBSQ HOSP IP/OBS HIGH 50: CPT | Performed by: HOSPITALIST

## 2021-01-14 PROCEDURE — 71045 X-RAY EXAM CHEST 1 VIEW: CPT | Performed by: HOSPITALIST

## 2021-01-14 PROCEDURE — 99222 1ST HOSP IP/OBS MODERATE 55: CPT | Performed by: INTERNAL MEDICINE

## 2021-01-14 RX ORDER — HYDROCODONE POLISTIREX AND CHLORPHENIRAMINE POLISTIREX 10; 8 MG/5ML; MG/5ML
5 SUSPENSION, EXTENDED RELEASE ORAL 2 TIMES DAILY PRN
Status: DISCONTINUED | OUTPATIENT
Start: 2021-01-14 | End: 2021-01-16

## 2021-01-14 NOTE — CONSULTS
[facility] HOSP - [facility]    Report of Consultation    Adam Ramirez Patient Status:  Inpatient    1970 MRN [id number]   Location [facility] 5SW/SE Attending Imelda Quiroz MD   Hosp Day # 4 PCP Popeye Cbarera MD     Date of Admission:   PRN **OR** morphINE sulfate (PF) 2 MG/ML injection 2 mg, 2 mg, Intravenous, Q2H PRN **OR** morphINE sulfate (PF) 4 MG/ML injection 4 mg, 4 mg, Intravenous, Q2H PRN  •  ondansetron HCl (ZOFRAN) injection 4 mg, 4 mg, Intravenous, Q6H PRN  •  cefTRIAXone [place] difficulty  Psychiatric:  oriented to time, self, and place  Extremities: no obvious extremity swelling, no lesions      Impression and Plan:  Patient Active Problem List:     Acute respiratory failure due to COVID-19 [facility])    Patient is a 44year old male

## 2021-01-14 NOTE — PLAN OF CARE
Problem: Patient Centered Care  Goal: Patient preferences are identified and integrated in the patient's plan of care  Description: Interventions:  - What would you like us to know as we care for you? I used to drink alcohol every day.   - Provide timely, discharge w/pt and caregiver  - Include patient/family/discharge partner in discharge planning  - Arrange for needed discharge resources and transportation as appropriate  - Identify discharge learning needs (meds, wound care, etc)  - Arrange for interpret

## 2021-01-14 NOTE — PLAN OF CARE
Problem: Patient Centered Care  Goal: Patient preferences are identified and integrated in the patient's plan of care  Description: Interventions:  - What would you like us to know as we care for you?  From home lives with wife  Problem: Patient/Family Go [id number] by Trent Chavez RN  Outcome: Progressing  1/14/2021 0425 by Trent Chavez RN  Outcome: Progressing     Problem: SAFETY ADULT - FALL  Goal: Free from fall injury  Description: INTERVENTIONS:  - Assess pt frequently for physical needs  - Identi and instruct to report SOB or any respiratory difficulty  - Respiratory Therapy support as indicated  - Manage/alleviate anxiety  - Monitor for signs/symptoms of CO2 retention  1/14/2021 0426 by Nichol Olmedo RN  Outcome: Not Progressing  1/14/2021 042

## 2021-01-14 NOTE — PROGRESS NOTES
[facility]    Progress Note    Skyler Rosario Patient Status:  Inpatient    1970 MRN [id number]   Location [facility] 5SW/SE Attending Yesenia Jay MD   Hosp Day # 4 PCP Philomena Mari MD       Subjective:   Skyler Rosario is feel [facility], Saline Nasal Spray, influenza virus vaccine PF, acetaminophen **OR** HYDROcodone-acetaminophen **OR** HYDROcodone-acetaminophen, morphINE sulfate **OR** morphINE sulfate **OR** morphINE sulfate, ondansetron HCl, acetaminophen **OR** HYD significant change in the appearance of atypical pneumonia.      Dictated by (CST): Nathan Patton MD on 1/14/2021 at 7:18 AM     Finalized by (CST): Nathan Patton MD on 1/14/2021 at 7:19 AM          Xr Chest Ap Portable  (cpt=71045)    R

## 2021-01-14 NOTE — PROGRESS NOTES
[facility]    Progress Note    Delano Lundborg Patient Status:  Inpatient    1970 MRN [id number]   Location [facility] 5SW/SE Attending Kevin Campos MD   Hosp Day # 4 PCP Antony Gonzalez MD        Subjective:    The patient is a twice daily    D/w unit APN and RN.     Results:     Lab Results   Component Value Date    WBC 13.0 (H) 01/14/2021    HGB 16.2 01/14/2021    HCT 46.9 01/14/2021    .0 01/14/2021    CREATSERUM 0.66 (L) 01/14/2021    BUN 17 01/14/2021     (L) 01/

## 2021-01-14 NOTE — PLAN OF CARE
Covid +: 12/31/2020  Symptom Onset: 12/27/2020  Tmax: Afebrile  O2: 10L high Flow @ 94% wean as tolearted     Monitor Labs, inflammatory markers trending down, DDimer elevating - will montior  LDH: 380 -> 353 -> 348 -> 351  Ferritin: 495 -> 354 -> 379 -> 4

## 2021-01-15 LAB
ANION GAP SERPL CALC-SCNC: 3 MMOL/L (ref 0–18)
BUN BLD-MCNC: 19 MG/DL (ref 7–18)
BUN/CREAT SERPL: 23.2 (ref 10–20)
CALCIUM BLD-MCNC: 9.5 MG/DL (ref 8.5–10.1)
CHLORIDE SERPL-SCNC: 97 MMOL/L (ref 98–112)
CO2 SERPL-SCNC: 34 MMOL/L (ref 21–32)
CREAT BLD-MCNC: 0.82 MG/DL
D DIMER PPP FEU-MCNC: 3.1 UG/ML FEU (ref ?–0.52)
DEPRECATED RDW RBC AUTO: 37 FL (ref 35.1–46.3)
ERYTHROCYTE [DISTWIDTH] IN BLOOD BY AUTOMATED COUNT: 11.5 % (ref 11–15)
GLUCOSE BLD-MCNC: 193 MG/DL (ref 70–99)
GLUCOSE BLDC GLUCOMTR-MCNC: 192 MG/DL (ref 70–99)
GLUCOSE BLDC GLUCOMTR-MCNC: 249 MG/DL (ref 70–99)
GLUCOSE BLDC GLUCOMTR-MCNC: 274 MG/DL (ref 70–99)
GLUCOSE BLDC GLUCOMTR-MCNC: 284 MG/DL (ref 70–99)
GLUCOSE BLDC GLUCOMTR-MCNC: 403 MG/DL (ref 70–99)
HCT VFR BLD AUTO: 49.5 %
HGB BLD-MCNC: 16.8 G/DL
MCH RBC QN AUTO: 30.4 PG (ref 26–34)
MCHC RBC AUTO-ENTMCNC: 33.9 G/DL (ref 31–37)
MCV RBC AUTO: 89.5 FL
OSMOLALITY SERPL CALC.SUM OF ELEC: 286 MOSM/KG (ref 275–295)
PLATELET # BLD AUTO: 443 10(3)UL (ref 150–450)
POTASSIUM SERPL-SCNC: 4.2 MMOL/L (ref 3.5–5.1)
RBC # BLD AUTO: 5.53 X10(6)UL
SODIUM SERPL-SCNC: 134 MMOL/L (ref 136–145)
WBC # BLD AUTO: 14.2 X10(3) UL (ref 4–11)

## 2021-01-15 PROCEDURE — 99232 SBSQ HOSP IP/OBS MODERATE 35: CPT | Performed by: INTERNAL MEDICINE

## 2021-01-15 PROCEDURE — 99232 SBSQ HOSP IP/OBS MODERATE 35: CPT | Performed by: PHYSICIAN ASSISTANT

## 2021-01-15 PROCEDURE — 99233 SBSQ HOSP IP/OBS HIGH 50: CPT | Performed by: HOSPITALIST

## 2021-01-15 NOTE — PLAN OF CARE
Problem: Patient Centered Care  Goal: Patient preferences are identified and integrated in the patient's plan of care  Description: Interventions:  - What would you like us to know as we care for you?  \"I used to drink alcohol every day\"  - Provide time Goal  Description: Patient's Long Term Goal: return home, respiratory status to return to normal limits, blood sugars within acceptable levels    Interventions:  - Oxygen, steroids as ordered, albuterol inhaler as ordered, novolog/levemir insulin as ordere effects  - Notify MD/LIP if interventions unsuccessful or patient reports new pain  - Anticipate increased pain with activity and pre-medicate as appropriate  Outcome: Progressing     Problem: RISK FOR INFECTION - ADULT  Goal: Absence of fever/infection du schedule  Outcome: Progressing     Problem: DISCHARGE PLANNING  Goal: Discharge to home or other facility with appropriate resources  Description: INTERVENTIONS:  - Identify barriers to discharge w/pt and caregiver  - Include patient/family/discharge partn for changes in respiratory status  - Assess for changes in mentation and behavior  - Position to facilitate oxygenation and minimize respiratory effort  - Oxygen supplementation based on oxygen saturation or ABGs  - Provide Smoking Cessation handout, if ap as needed  Outcome: Progressing     Problem: Diabetes/Glucose Control  Goal: Glucose maintained within prescribed range  Description: INTERVENTIONS:  - Monitor Blood Glucose as ordered  - Assess for signs and symptoms of hyperglycemia and hypoglycemia  - A

## 2021-01-15 NOTE — PROGRESS NOTES
[facility]    Progress Note    Phuc Quispe Patient Status:  Inpatient    1970 MRN [id number]   Location [facility] 5SW/SE Attending Nima Holley MD   Hosp Day # 5 PCP Ashok Oreilly MD       Subjective:   Phuc Quispe is feel Current PRN Inpatient Meds:      Hydrocod Polst-CPM Polst ER, Saline Nasal Spray, influenza virus vaccine PF, acetaminophen **OR** HYDROcodone-acetaminophen **OR** HYDROcodone-acetaminophen, morphINE sulfate **OR** morphINE sulfate **OR** morphINE evans Dictated by (CST): Lucie Patton MD on 1/14/2021 at 7:18 AM     Finalized by (CST): Lucie Patton MD on 1/14/2021 at 7:19 AM              Assessment and Plan:     Acute hypoxemic respiratory failure   COVID 19 PNA   - CT chest neg for P

## 2021-01-15 NOTE — PLAN OF CARE
Covid +: 12/31/2020  Symptom Onset: 12/27/2020  Tmax: Afebrile  O2: 9L high Flow @ 92% wean as tolearted     Monitor Labs, inflammatory markers trending down, - will monitor DDimner  LDH: 380 -> 353 -> 348 -> 351  Ferritin: 495 -> 354 -> 379 -> 439  CRP: 5

## 2021-01-15 NOTE — PROGRESS NOTES
[facility]    Progress Note    Sarah Doty Patient Status:  Inpatient    1970 MRN [id number]   Location [facility] 5SW/SE Attending Anastasia Abraham MD   Hosp Day # 5 PCP Viviana Alas MD        Subjective:    The patient is a NOAC for 30 days at discharge given high d-dimer    D/w RN.     Results:     Lab Results   Component Value Date    WBC 14.2 (H) 01/15/2021    HGB 16.8 01/15/2021    HCT 49.5 01/15/2021    .0 01/15/2021    CREATSERUM 0.82 01/15/2021    BUN 19 (H) 01/1

## 2021-01-15 NOTE — PROGRESS NOTES
INFECTIOUS DISEASE PROGRESS NOTE  [facility]    Marivel Mix Patient Status:  Inpatient    1970 MRN [id number]   Location [facility] 5SW/SE Attending Popeye Gasca MD   Hosp Day # 5 PCP [facility] - RDV - not candidate               - CCP - not candidate               - Actemra - 1/11               - Abx - 1/10    - dopplers w/o DVT     PLAN:  -  Continue on ceftriaxone - day #6/7.  -  Hold RDV, CCP.  -  On dexamethasone.  -  isolation

## 2021-01-16 VITALS
SYSTOLIC BLOOD PRESSURE: 123 MMHG | HEIGHT: 71 IN | TEMPERATURE: 98 F | RESPIRATION RATE: 18 BRPM | WEIGHT: 224 LBS | DIASTOLIC BLOOD PRESSURE: 77 MMHG | OXYGEN SATURATION: 88 % | BODY MASS INDEX: 31.36 KG/M2 | HEART RATE: 70 BPM

## 2021-01-16 LAB
D DIMER PPP FEU-MCNC: 2.11 UG/ML FEU (ref ?–0.52)
GLUCOSE BLDC GLUCOMTR-MCNC: 242 MG/DL (ref 70–99)
GLUCOSE BLDC GLUCOMTR-MCNC: 257 MG/DL (ref 70–99)
GLUCOSE BLDC GLUCOMTR-MCNC: 273 MG/DL (ref 70–99)

## 2021-01-16 PROCEDURE — 99239 HOSP IP/OBS DSCHRG MGMT >30: CPT | Performed by: HOSPITALIST

## 2021-01-16 PROCEDURE — 99232 SBSQ HOSP IP/OBS MODERATE 35: CPT | Performed by: INTERNAL MEDICINE

## 2021-01-16 RX ORDER — GLIPIZIDE 10 MG/1
10 TABLET ORAL
Qty: 60 TABLET | Refills: 0 | Status: SHIPPED | OUTPATIENT
Start: 2021-01-16 | End: 2021-02-15

## 2021-01-16 NOTE — PROGRESS NOTES
Patient's sat on room air is 88% at rest. Pt's O2 sat on room air is 84% when ambulating, 91% and on 2L while ambulating.

## 2021-01-16 NOTE — CM/SW NOTE
Notified by Dr Ashley Hutson that patient is ready for discharge today and will need Home O2 and Xarelto 10mg.   Available coupon is for 20mg so this CM called  [address] at [phone] ([phone]) for the offer for 10mg and nobody elias

## 2021-01-16 NOTE — PROGRESS NOTES
[address] [facility] TIFFANI Quiñones Patient Status:  Inpatient    1970 MRN [id number]   Location [facility] 5SW/SE Attending Chito Frafan MD   Hosp Day # 5 PCP Johnnie Mann MD     Date of Admission:  1/10/2021 Q4H PRN  •  morphINE sulfate (PF) 2 MG/ML injection 1 mg, 1 mg, Intravenous, Q2H PRN **OR** morphINE sulfate (PF) 2 MG/ML injection 2 mg, 2 mg, Intravenous, Q2H PRN **OR** morphINE sulfate (PF) 4 MG/ML injection 4 mg, 4 mg, Intravenous, Q2H PRN  •  ondanse bruising  Neuro: motor grossly intact, moving all extremities without difficulty  Psychiatric:  oriented to time, self, and place  Extremities: no obvious extremity swelling, no lesions      Impression and Plan:  Patient Active Problem List:     Acute resp

## 2021-01-16 NOTE — DISCHARGE SUMMARY
[facility] Discharge Summary   Patient ID:  Arjun Hooper  [id number]  77 year old  11/17/1970    Admit date: 1/10/2021  Discharge date: 1/16/2021  Primary Care Physician: Gregg Grimaldo MD   Attending Physician: Binu Tadeo MD   Consults:   Cons week.   - ADA diet.        EXAM:   GENERAL: no apparent distress, comfortable  NEURO: A/A Ox3, no focal deficits  RESP: non labored, CTAB/L  CARDIO: Regular, no murmur  ABD: soft, NT, ND  EXTREMITIES: no edema, no calf tenderness    Operative Procedures: BG fasting and before all meals for her to see. Wear continuous oxygen at home. Important follow up: Follow-up Information     Lala Hearn MD In 2 weeks.     Specialty: Family Medicine  Contact information:  [address]  [facility]

## 2021-01-16 NOTE — CM/SW NOTE
Received call from [facility] from [facility] at [phone] regarding referral sent for delivery of the O2 concentrator. Jesse stated that O2 concentrator should be delivered to patient's home address between 8p - 10pm:    [address]

## 2021-01-16 NOTE — CM/SW NOTE
Called [facility] at [phone] to speak with [facility]. [facility] answering service is paging him to call [facility] regarding status of O2 concentrator.

## 2021-01-17 NOTE — PROGRESS NOTES
Pulmonary/Critical Care Follow Up Note    HPI:   Jeanine Quintero is a 44year old male with Patient presents with:  Difficulty Breathing      PCP Linda Hamilton MD  Admission Attending Gissell Elizabeth [facility] Day #6    Feeling better  No cough  No sob  No f 5-325 MG per tab 2 tablet, 2 tablet, Oral, Q4H PRN  •  Timolol Maleate (TIMOPTIC) 0.5 % ophthalmic solution 1 drop, 1 drop, Both Eyes, BID  •  sodium chloride 0.9% IV bolus 500 mL, 500 mL, Intravenous, PRN  •  Insulin Aspart Pen (NOVOLOG) 100 UNIT/ML flexp

## 2021-01-17 NOTE — PLAN OF CARE
Problem: Patient Centered Care  Goal: Patient preferences are identified and integrated in the patient's plan of care  Description: Interventions:  - What would you like us to know as we care for you?  \"I used to drink alcohol every day\"  - Provide time pain  - Anticipate increased pain with activity and pre-medicate as appropriate  Outcome: Adequate for Discharge     Problem: RISK FOR INFECTION - ADULT  Goal: Absence of fever/infection during anticipated neutropenic period  Description: INTERVENTIONS  - Problem: RESPIRATORY - ADULT  Goal: Achieves optimal ventilation and oxygenation  Description: INTERVENTIONS:  - Assess for changes in respiratory status  - Assess for changes in mentation and behavior  - Position to facilitate oxygenation and minimize r

## 2021-01-17 NOTE — CM/SW NOTE
Received call from Bryan Kirk, patient's wife, stating [facility] has delivered the O2 concentrator and that she is coming to pick patient up and will be here in 10 minutes. [facility] Thi Olivera, as patient's RN Jumana Blanc was unavailable.   [facility] infor

## 2021-01-18 ENCOUNTER — TELEPHONE (OUTPATIENT)
Dept: PULMONOLOGY | Facility: CLINIC | Age: 51
End: 2021-01-18

## 2021-01-18 ENCOUNTER — PATIENT OUTREACH (OUTPATIENT)
Dept: CASE MANAGEMENT | Age: 51
End: 2021-01-18

## 2021-01-18 NOTE — TELEPHONE ENCOUNTER
Sherman Deleon requesting to be seen sooner than next avail for Vinny HODGES 54. pt recently dx COVID19. Please call. Thank you.

## 2021-01-18 NOTE — PROGRESS NOTES
1st attempt DM/Endo    Sophie Ricardo [address]  [address] [place] [address] [phone]    Patient has apt previously scheduled for Tues.  [facility]th [facility]

## 2021-01-18 NOTE — PAYOR COMM NOTE
--------------  DISCHARGE REVIEW    Payor: [address] #:  [id number]  Authorization Number: [id number]    Admit date: 1/10/21  Admit time:  1071  Discharge Date: 1/16/2021  8:00 PM     Admitting Physician: Cleveland Suero Course:  Acute hypoxemic respiratory failure   COVID 19 PNA   - CT chest neg for PE  - CXR with B/L ground glass opacities. - cont decadron.   - s/p Actemra 1/11  - not candidate for RDV or CCP.   - ID and pulm following.   - follow inflammatory markers. Tabs  Commonly known as: ZITHROMAX     predniSONE 20 MG Tabs  Commonly known as: Juju Benton           Where to Get Your Medications      These medications were sent to [facility] [address], [address] AT [facility] [address] [facility]

## 2021-01-20 NOTE — TELEPHONE ENCOUNTER
Pina Gardner accepted appt 1/26 9:30 am [facility]. Appt info given. Reassurance provided. She voiced understanding.

## 2021-01-25 NOTE — PROGRESS NOTES
Please note that the following visit was completed using two-way, real-time interactive audio and video communication.   This has been done in good laura to provide continuity of care in the best interest of the provider-patient relationship, due to the oswald Hypertension present: yes, is on treatment         Hyperlipidemia present: yes, is on treatment         Peripheral Vascular Disease present: none    : Nephropathy present: unknown    Neuro: Neuropathy present: unknown    Skin: Infection or ulceration: no Value Date     (H) 01/15/2021    BUN 19 (H) 01/15/2021    BUNCREA 23.2 (H) 01/15/2021    CREATSERUM 0.82 01/15/2021    ANIONGAP 3 01/15/2021    GFRNAA 102 01/15/2021    GFRAA 118 01/15/2021    CA 9.5 01/15/2021    OSMOCALC 286 01/15/2021    ALKPHO 8 diet, with an emphasis on vegetables at every meal, with lots whole grains, and protein from either plant-based sources, or poultry and fish.    - Patient will work on [employer]  - He is still recovering from pneumonia and is slowly working on [employer]

## 2021-01-26 ENCOUNTER — TELEMEDICINE (OUTPATIENT)
Dept: ENDOCRINOLOGY CLINIC | Facility: CLINIC | Age: 51
End: 2021-01-26

## 2021-01-26 ENCOUNTER — OFFICE VISIT (OUTPATIENT)
Dept: PULMONOLOGY | Facility: CLINIC | Age: 51
End: 2021-01-26
Payer: COMMERCIAL

## 2021-01-26 VITALS
BODY MASS INDEX: 31.64 KG/M2 | HEIGHT: 71 IN | HEART RATE: 95 BPM | SYSTOLIC BLOOD PRESSURE: 136 MMHG | OXYGEN SATURATION: 94 % | RESPIRATION RATE: 18 BRPM | WEIGHT: 226 LBS | TEMPERATURE: 99 F | DIASTOLIC BLOOD PRESSURE: 86 MMHG

## 2021-01-26 DIAGNOSIS — E11.65 TYPE 2 DIABETES MELLITUS WITH HYPERGLYCEMIA, WITHOUT LONG-TERM CURRENT USE OF INSULIN (HCC): Primary | ICD-10-CM

## 2021-01-26 DIAGNOSIS — U07.1 COVID-19: Primary | ICD-10-CM

## 2021-01-26 PROCEDURE — 3075F SYST BP GE 130 - 139MM HG: CPT | Performed by: INTERNAL MEDICINE

## 2021-01-26 PROCEDURE — 3008F BODY MASS INDEX DOCD: CPT | Performed by: INTERNAL MEDICINE

## 2021-01-26 PROCEDURE — 99213 OFFICE O/P EST LOW 20 MIN: CPT | Performed by: INTERNAL MEDICINE

## 2021-01-26 PROCEDURE — 3079F DIAST BP 80-89 MM HG: CPT | Performed by: INTERNAL MEDICINE

## 2021-01-26 RX ORDER — LISINOPRIL 10 MG/1
TABLET ORAL DAILY
COMMUNITY
Start: 2020-11-17

## 2021-01-26 NOTE — PROGRESS NOTES
Referring Physician  Kristi Soto MD    History of Present Illness  Patient seen today for follow-up visit to pulmonary clinic. Discharge approximately 1 month ago with evidence of COVID-19 pneumonia. Intermittently using 2 L nasal cannula oxygen.   Yelitza Lewis time received course of antibiotic therapy with Rocephin and completed course of Decadron. Status post Actemra. Did not receive remdesivir given clinical onset of symptoms. Appears to be clinically improving from a pulmonary standpoint.   I advised the p

## 2021-03-29 ENCOUNTER — IMMUNIZATION (OUTPATIENT)
Dept: LAB | Facility: HOSPITAL | Age: 51
End: 2021-03-29
Attending: HOSPITALIST
Payer: COMMERCIAL

## 2021-03-29 DIAGNOSIS — Z23 NEED FOR VACCINATION: Primary | ICD-10-CM

## 2021-03-29 PROCEDURE — 0011A SARSCOV2 VAC 100MCG/0.5ML IM: CPT

## 2021-05-03 ENCOUNTER — IMMUNIZATION (OUTPATIENT)
Dept: LAB | Facility: HOSPITAL | Age: 51
End: 2021-05-03
Attending: EMERGENCY MEDICINE
Payer: COMMERCIAL

## 2021-05-03 DIAGNOSIS — Z23 NEED FOR VACCINATION: Primary | ICD-10-CM

## 2021-05-03 PROCEDURE — 0012A SARSCOV2 VAC 100MCG/0.5ML IM: CPT

## 2021-06-07 ENCOUNTER — TELEPHONE (OUTPATIENT)
Dept: PULMONOLOGY | Facility: CLINIC | Age: 51
End: 2021-06-07

## (undated) NOTE — LETTER
Hospital Discharge Documentation  Please phone to schedule a hospital follow up appointment.     From: [address] Hospitalist's Office  Phone: [phone]    Patient discharged time/date: 1/16/2021  8:00 PM  Patient discharge disposition:  Home or Self Acute hypoxemic respiratory failure   COVID 19 PNA   - CT chest neg for PE  - CXR with B/L ground glass opacities. - cont decadron.   - s/p Actemra 1/11  - not candidate for RDV or CCP.   - ID and pulm following.   - follow inflammatory markers.    - on r azithromycin 250 MG Tabs  Commonly known as: ZITHROMAX     predniSONE 20 MG Tabs  Commonly known as: Torie Renee           Where to Get Your Medications      These medications were sent to [facility] [address], [address] [facility] AP.1 Addison Colin MD on 1/16/2021  3:09 PM   AP. 2 - Makenzie Lopez MD on 1/17/2021  4:32 PM   AP. 3 - Makenzie Lopez MD on 1/17/2021  4:30 PM